# Patient Record
Sex: FEMALE | Race: WHITE | NOT HISPANIC OR LATINO | Employment: STUDENT | URBAN - METROPOLITAN AREA
[De-identification: names, ages, dates, MRNs, and addresses within clinical notes are randomized per-mention and may not be internally consistent; named-entity substitution may affect disease eponyms.]

---
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
## 2018-01-08 ENCOUNTER — PATIENT MESSAGE (OUTPATIENT)
Dept: MEDICAL GROUP | Facility: MEDICAL CENTER | Age: 18
End: 2018-01-08

## 2018-01-08 DIAGNOSIS — M54.41 ACUTE MIDLINE LOW BACK PAIN WITH RIGHT-SIDED SCIATICA: ICD-10-CM

## 2018-01-08 DIAGNOSIS — M51.26 LUMBAR HERNIATED DISC: ICD-10-CM

## 2018-01-10 RX ORDER — GABAPENTIN 300 MG/1
300 CAPSULE ORAL 3 TIMES DAILY
Qty: 90 CAP | Refills: 1 | Status: SHIPPED | OUTPATIENT
Start: 2018-01-10

## 2018-01-10 RX ORDER — MELOXICAM 7.5 MG/1
7.5 TABLET ORAL DAILY
Qty: 30 TAB | Refills: 3 | Status: SHIPPED | OUTPATIENT
Start: 2018-01-10

## 2018-01-17 ENCOUNTER — OFFICE VISIT (OUTPATIENT)
Dept: MEDICAL GROUP | Facility: MEDICAL CENTER | Age: 18
End: 2018-01-17
Payer: COMMERCIAL

## 2018-01-17 VITALS
BODY MASS INDEX: 39.98 KG/M2 | HEIGHT: 66 IN | HEART RATE: 91 BPM | RESPIRATION RATE: 18 BRPM | TEMPERATURE: 97.7 F | DIASTOLIC BLOOD PRESSURE: 82 MMHG | OXYGEN SATURATION: 96 % | SYSTOLIC BLOOD PRESSURE: 138 MMHG | WEIGHT: 248.8 LBS

## 2018-01-17 DIAGNOSIS — M51.26 LUMBAR HERNIATED DISC: ICD-10-CM

## 2018-01-17 PROCEDURE — 99213 OFFICE O/P EST LOW 20 MIN: CPT | Performed by: PHYSICIAN ASSISTANT

## 2018-01-17 ASSESSMENT — PAIN SCALES - GENERAL: PAINLEVEL: 7=MODERATE-SEVERE PAIN

## 2018-01-17 NOTE — PROGRESS NOTES
"Chief Complaint   Patient presents with   • Back Pain     Sciatica in (R) leg, has not been to work in 2 wks, requesting a doctors Note from 1/8/18-1/17/18       HPI  Kalee Redmond is a 17 y.o. female here today for f/u on uncontrolled lower back pian. Pain in over R lower back w sciatica. Pain is constant, better w not moving 2/10, increasing to 7-8/10 w moving or prolonged standing. Pain is tingling going down the R leg w ankel pain. [facility] told her she has a bulged disk L5-S1 and she needs surgery, she couldn't afford epidural, dose not want surgery at this point. States she takes 2 tramadol at night which helps with the pain and helps her sleep through the night. Has had physical therapy without any help. Pt has been off work for about 3 wks.  Has tried muscle relaxant Flexeril in the past without any help            Past medical, surgical, family, and social history is reviewed in Epic chart by me today.   Medications and allergies reviewed and updated in Epic chart by me today.     ROS:   As documented in history of present illness above    Exam:  Blood pressure 138/82, pulse (!) 103, temperature 36.5 °C (97.7 °F), resp. rate 18, height 1.664 m (5' 5.51\"), weight 112.9 kg (248 lb 12.8 oz), last menstrual period 01/15/2018, SpO2 96 %, not currently breastfeeding.  Constitutional: Alert, no distress, plus 3 vital signs  Skin:  Warm, dry, no rashes invisible areas  Eye: Equal, round and reactive, conjunctiva clear  Respiratory: Unlabored respiratory effort, lungs clear to auscultation, no wheezes, no rhonchi  Cardiovascular: RRR, no murmur, no lower extremities edema, pedal pulses equal bilaterally and 2+/4   Psych: Alert, pleasant, well-groomed, normal affect    A/P:  1. Lumbar herniated disc  Would like a second opinion.  - Like thickened 7.5 daily when necessary  -Gabapentin 300 3 times a day  - REFERRAL TO PAIN CLINIC    Advised patient to stay away from narcotics especially being so young.  Headache " given for her work to excuse absence for 2 Weeks.    F/u prn

## 2018-01-17 NOTE — LETTER
January 17, 2018         Patient: Kalee Redmond   YOB: 2000   Date of Visit: 1/17/2018           To Whom it May Concern:    Kalee Redmond was seen in my clinic on 1/17/2018. Please excuse her absence from 01/08/18 - 01/24/18. Patient should avoid prolonged standing.   If you have any questions or concerns, please don't hesitate to call.        Sincerely,           Eden Trotter P.A.-C.  Electronically Signed

## 2018-01-31 ENCOUNTER — APPOINTMENT (OUTPATIENT)
Dept: MEDICAL GROUP | Facility: MEDICAL CENTER | Age: 18
End: 2018-01-31
Payer: COMMERCIAL